# Patient Record
Sex: FEMALE | Race: WHITE | NOT HISPANIC OR LATINO | Employment: STUDENT | URBAN - METROPOLITAN AREA
[De-identification: names, ages, dates, MRNs, and addresses within clinical notes are randomized per-mention and may not be internally consistent; named-entity substitution may affect disease eponyms.]

---

## 2018-10-05 ENCOUNTER — OFFICE VISIT (OUTPATIENT)
Dept: FAMILY MEDICINE CLINIC | Facility: CLINIC | Age: 18
End: 2018-10-05

## 2018-10-05 VITALS
OXYGEN SATURATION: 98 % | SYSTOLIC BLOOD PRESSURE: 90 MMHG | HEART RATE: 80 BPM | RESPIRATION RATE: 17 BRPM | DIASTOLIC BLOOD PRESSURE: 60 MMHG | BODY MASS INDEX: 16.24 KG/M2 | WEIGHT: 86 LBS | HEIGHT: 61 IN | TEMPERATURE: 98.2 F

## 2018-10-05 DIAGNOSIS — Z00.00 ANNUAL PHYSICAL EXAM: Primary | ICD-10-CM

## 2018-10-05 LAB
25(OH)D3+25(OH)D2 SERPL-MCNC: 34.2 NG/ML (ref 30–100)
ALBUMIN SERPL-MCNC: 5.3 G/DL (ref 3.5–5.2)
ALBUMIN/GLOB SERPL: 2.2 G/DL
ALP SERPL-CCNC: 66 U/L (ref 39–117)
ALT SERPL-CCNC: 11 U/L (ref 1–33)
AST SERPL-CCNC: 15 U/L (ref 1–32)
BASOPHILS # BLD AUTO: 0.05 10*3/MM3 (ref 0–0.2)
BASOPHILS NFR BLD AUTO: 0.7 % (ref 0–1.5)
BILIRUB SERPL-MCNC: 0.3 MG/DL (ref 0.1–1.2)
BUN SERPL-MCNC: 17 MG/DL (ref 6–20)
BUN/CREAT SERPL: 21 (ref 7–25)
CALCIUM SERPL-MCNC: 10.1 MG/DL (ref 8.6–10.5)
CHLORIDE SERPL-SCNC: 105 MMOL/L (ref 98–107)
CO2 SERPL-SCNC: 24.9 MMOL/L (ref 22–29)
CREAT SERPL-MCNC: 0.81 MG/DL (ref 0.57–1)
EOSINOPHIL # BLD AUTO: 0.16 10*3/MM3 (ref 0–0.7)
EOSINOPHIL NFR BLD AUTO: 2.3 % (ref 0.3–6.2)
ERYTHROCYTE [DISTWIDTH] IN BLOOD BY AUTOMATED COUNT: 13 % (ref 11.7–13)
GLOBULIN SER CALC-MCNC: 2.4 GM/DL
GLUCOSE SERPL-MCNC: 92 MG/DL (ref 65–99)
HCT VFR BLD AUTO: 37.4 % (ref 35.6–45.5)
HGB BLD-MCNC: 12 G/DL (ref 11.9–15.5)
IMM GRANULOCYTES # BLD: 0.03 10*3/MM3 (ref 0–0.03)
IMM GRANULOCYTES NFR BLD: 0.4 % (ref 0–0.5)
LYMPHOCYTES # BLD AUTO: 1.6 10*3/MM3 (ref 0.9–4.8)
LYMPHOCYTES NFR BLD AUTO: 23 % (ref 19.6–45.3)
MCH RBC QN AUTO: 30.5 PG (ref 26.9–32)
MCHC RBC AUTO-ENTMCNC: 32.1 G/DL (ref 32.4–36.3)
MCV RBC AUTO: 95.2 FL (ref 80.5–98.2)
MONOCYTES # BLD AUTO: 0.51 10*3/MM3 (ref 0.2–1.2)
MONOCYTES NFR BLD AUTO: 7.3 % (ref 5–12)
NEUTROPHILS # BLD AUTO: 4.64 10*3/MM3 (ref 1.9–8.1)
NEUTROPHILS NFR BLD AUTO: 66.7 % (ref 42.7–76)
PLATELET # BLD AUTO: 209 10*3/MM3 (ref 140–500)
POTASSIUM SERPL-SCNC: 4.3 MMOL/L (ref 3.5–5.2)
PROT SERPL-MCNC: 7.7 G/DL (ref 6–8.5)
RBC # BLD AUTO: 3.93 10*6/MM3 (ref 3.9–5.2)
SODIUM SERPL-SCNC: 144 MMOL/L (ref 136–145)
WBC # BLD AUTO: 6.96 10*3/MM3 (ref 4.5–10.7)

## 2018-10-05 PROCEDURE — 99385 PREV VISIT NEW AGE 18-39: CPT | Performed by: NURSE PRACTITIONER

## 2018-10-05 NOTE — PROGRESS NOTES
Subjective   Jane Felix is a 17 y.o. female. Patient is here today for   Chief Complaint   Patient presents with   • Annual Exam     here  with guardian          Vitals:    10/05/18 1006   BP: 90/60   Pulse: 80   Resp: 17   Temp: 98.2 °F (36.8 °C)   SpO2: 98%      Patient's last menstrual period was 09/09/2018 (exact date).    The following portions of the patient's history were reviewed and updated as appropriate: allergies, current medications, past family history, past medical history, past social history, past surgical history and problem list.    No past medical history on file.   No Known Allergies   Social History     Social History   • Marital status: Single     Spouse name: N/A   • Number of children: N/A   • Years of education: N/A     Occupational History   • Not on file.     Social History Main Topics   • Smoking status: Never Smoker   • Smokeless tobacco: Not on file   • Alcohol use No   • Drug use: Unknown   • Sexual activity: Not on file     Other Topics Concern   • Not on file     Social History Narrative   • No narrative on file        Current Outpatient Prescriptions:   •  Multiple Vitamins-Minerals (CENTRUM ADULTS PO), Take  by mouth., Disp: , Rfl:        Objective   History of Present Illness   Jane Felix 17 y.o. female who presents for an Annual Wellness Visit.  she has a history of There is no problem list on file for this patient.  .  she has been doing well with new interval problems.  Labs results discussed in detail with the patient.  Plan to update vaccines if needed today.    Health Habits:  Dental Exam. up to date  Eye Exam. not up to date - mom states has no visual issues   Exercise: 3 times/week.  Current exercise activities include: rides scooter     She is not fasting today for labs  I reviewed records from Dr Sky office and they are scanned into epic    Lab Results (most recent)     None            Review of Systems   Constitutional: Negative.    HENT:        Ulcer to  tip of tongue, no pain    Respiratory: Negative.    Cardiovascular: Negative.    Gastrointestinal: Negative.    Genitourinary: Negative.        Physical Exam   Constitutional: She is oriented to person, place, and time. Vital signs are normal. She appears well-developed and well-nourished. No distress.   HENT:   Head: Normocephalic.   Right Ear: Tympanic membrane and ear canal normal.   Left Ear: Tympanic membrane and ear canal normal.   Nose: Nose normal.   Mouth/Throat: Uvula is midline and oropharynx is clear and moist.   Ulcer to tip of tongue   Wears braces    Eyes: Conjunctivae, EOM and lids are normal. Lids are everted and swept, no foreign bodies found.   Neck: Trachea normal.   Cardiovascular: Normal rate, regular rhythm and normal heart sounds.    Pulmonary/Chest: Effort normal and breath sounds normal. Right breast exhibits no mass, no skin change and no tenderness. Left breast exhibits no mass, no skin change and no tenderness.   Abdominal: Soft. Normal appearance. There is no tenderness.   Musculoskeletal: Normal range of motion.   Neurological: She is alert and oriented to person, place, and time.   Skin: Skin is warm and dry. No rash noted.       ASSESSMENT       Problem List Items Addressed This Visit     None      Visit Diagnoses     Annual physical exam    -  Primary    Relevant Orders    Comprehensive Metabolic Panel    CBC & Differential    Vitamin D 25 Hydroxy          PLAN    Will check routine labs, she is not fasting today  Declined flu vaccine  Follow up as needed

## 2018-10-08 ENCOUNTER — TELEPHONE (OUTPATIENT)
Dept: FAMILY MEDICINE CLINIC | Facility: CLINIC | Age: 18
End: 2018-10-08

## 2021-11-24 ENCOUNTER — OFFICE VISIT (OUTPATIENT)
Dept: INTERNAL MEDICINE | Facility: CLINIC | Age: 21
End: 2021-11-24

## 2021-11-24 VITALS
OXYGEN SATURATION: 100 % | HEIGHT: 61 IN | WEIGHT: 86 LBS | RESPIRATION RATE: 16 BRPM | TEMPERATURE: 97.8 F | SYSTOLIC BLOOD PRESSURE: 118 MMHG | DIASTOLIC BLOOD PRESSURE: 70 MMHG | BODY MASS INDEX: 16.24 KG/M2 | HEART RATE: 76 BPM

## 2021-11-24 DIAGNOSIS — B37.2 YEAST DERMATITIS: ICD-10-CM

## 2021-11-24 DIAGNOSIS — R21 RASH: Primary | ICD-10-CM

## 2021-11-24 PROCEDURE — 99202 OFFICE O/P NEW SF 15 MIN: CPT | Performed by: NURSE PRACTITIONER

## 2021-11-24 RX ORDER — CLOTRIMAZOLE 1 %
1 CREAM (GRAM) TOPICAL 2 TIMES DAILY
Qty: 45 G | Refills: 1 | Status: SHIPPED | OUTPATIENT
Start: 2021-11-24 | End: 2023-03-22

## 2021-11-24 NOTE — PROGRESS NOTES
Subjective   Jane Felix is a 21 y.o. female.   Chief Complaint   Patient presents with   • Rash     Vitals:    11/24/21 0828   BP: 118/70   Pulse: 76   Resp: 16   Temp: 97.8 °F (36.6 °C)   SpO2: 100%     No LMP recorded.    Jane is a 21 year old female patient who is established but has not been seen in our office since 10/5/2018. She c/o rash to right axilla . She is here with her mom today     Rash  This is a new problem. The current episode started 1 to 4 weeks ago. The problem is unchanged. Location: right axilla  The rash is characterized by redness and itchiness. She was exposed to nothing. Pertinent negatives include no fatigue or fever. (None ) Past treatments include topical steroids. The treatment provided mild relief.        The following portions of the patient's history were reviewed and updated as appropriate: allergies, current medications, past family history, past medical history, past social history, past surgical history and problem list.    Review of Systems   Constitutional: Negative for fatigue and fever.   Respiratory: Negative.    Cardiovascular: Negative.    Skin: Positive for rash.       Objective   Physical Exam  Vitals and nursing note reviewed.   Constitutional:       General: She is not in acute distress.     Appearance: Normal appearance. She is well-developed and well-groomed.   Cardiovascular:      Rate and Rhythm: Normal rate.   Pulmonary:      Effort: Pulmonary effort is normal.   Chest:   Breasts:      Right: No axillary adenopathy.       Lymphadenopathy:      Upper Body:      Right upper body: No axillary adenopathy.   Skin:     Findings: Rash is not crusting or vesicular.      Comments: Right axilla with erythematous rash with scaling, blanches, one satellite lesion noted    Neurological:      Mental Status: She is alert.         Assessment/Plan   Diagnoses and all orders for this visit:    1. Rash (Primary)    2. Yeast dermatitis    Other orders  -     clotrimazole  (LOTRIMIN) 1 % cream; Apply 1 application topically to the appropriate area as directed 2 (Two) Times a Day.  Dispense: 45 g; Refill: 1      Use clotrimazole for 2-4 weeks   Can use hydrocortisone as needed for itching  Keep the area clean and dry  Avoid using deodorant directly on rash and shaving on rash  Follow up if symptoms persist, worsen, or if new symptoms develop   Pt will schedule CPE with labs

## 2021-12-16 DIAGNOSIS — Z11.59 NEED FOR HEPATITIS C SCREENING TEST: ICD-10-CM

## 2021-12-16 DIAGNOSIS — Z00.00 ROUTINE HEALTH MAINTENANCE: Primary | ICD-10-CM

## 2021-12-21 LAB
ALBUMIN SERPL-MCNC: 4.9 G/DL (ref 3.9–5)
ALBUMIN/GLOB SERPL: 2.3 {RATIO} (ref 1.2–2.2)
ALP SERPL-CCNC: 66 IU/L (ref 44–121)
ALT SERPL-CCNC: 12 IU/L (ref 0–32)
AST SERPL-CCNC: 19 IU/L (ref 0–40)
BASOPHILS # BLD AUTO: 0.1 X10E3/UL (ref 0–0.2)
BASOPHILS NFR BLD AUTO: 2 %
BILIRUB SERPL-MCNC: 0.3 MG/DL (ref 0–1.2)
BUN SERPL-MCNC: 17 MG/DL (ref 6–20)
BUN/CREAT SERPL: 24 (ref 9–23)
CALCIUM SERPL-MCNC: 9.6 MG/DL (ref 8.7–10.2)
CHLORIDE SERPL-SCNC: 105 MMOL/L (ref 96–106)
CHOLEST SERPL-MCNC: 146 MG/DL (ref 100–199)
CO2 SERPL-SCNC: 22 MMOL/L (ref 20–29)
CREAT SERPL-MCNC: 0.72 MG/DL (ref 0.57–1)
EOSINOPHIL # BLD AUTO: 0.2 X10E3/UL (ref 0–0.4)
EOSINOPHIL NFR BLD AUTO: 2 %
ERYTHROCYTE [DISTWIDTH] IN BLOOD BY AUTOMATED COUNT: 11.1 % (ref 11.7–15.4)
GLOBULIN SER CALC-MCNC: 2.1 G/DL (ref 1.5–4.5)
GLUCOSE SERPL-MCNC: 89 MG/DL (ref 65–99)
HCT VFR BLD AUTO: 36 % (ref 34–46.6)
HCV AB S/CO SERPL IA: <0.1 S/CO RATIO (ref 0–0.9)
HCV AB SERPL QL IA: NORMAL
HDLC SERPL-MCNC: 66 MG/DL
HGB BLD-MCNC: 12.1 G/DL (ref 11.1–15.9)
IMM GRANULOCYTES # BLD AUTO: 0 X10E3/UL (ref 0–0.1)
IMM GRANULOCYTES NFR BLD AUTO: 1 %
LDLC SERPL CALC-MCNC: 70 MG/DL (ref 0–99)
LDLC/HDLC SERPL: 1.1 RATIO (ref 0–3.2)
LYMPHOCYTES # BLD AUTO: 2 X10E3/UL (ref 0.7–3.1)
LYMPHOCYTES NFR BLD AUTO: 31 %
MCH RBC QN AUTO: 30.6 PG (ref 26.6–33)
MCHC RBC AUTO-ENTMCNC: 33.6 G/DL (ref 31.5–35.7)
MCV RBC AUTO: 91 FL (ref 79–97)
MONOCYTES # BLD AUTO: 0.5 X10E3/UL (ref 0.1–0.9)
MONOCYTES NFR BLD AUTO: 8 %
NEUTROPHILS # BLD AUTO: 3.6 X10E3/UL (ref 1.4–7)
NEUTROPHILS NFR BLD AUTO: 56 %
PLATELET # BLD AUTO: 232 X10E3/UL (ref 150–450)
POTASSIUM SERPL-SCNC: 4.1 MMOL/L (ref 3.5–5.2)
PROT SERPL-MCNC: 7 G/DL (ref 6–8.5)
RBC # BLD AUTO: 3.95 X10E6/UL (ref 3.77–5.28)
SODIUM SERPL-SCNC: 142 MMOL/L (ref 134–144)
T4 FREE SERPL-MCNC: 1.67 NG/DL (ref 0.82–1.77)
TRIGL SERPL-MCNC: 41 MG/DL (ref 0–149)
TSH SERPL DL<=0.005 MIU/L-ACNC: 5.88 UIU/ML (ref 0.45–4.5)
UNABLE TO VOID: NORMAL
VLDLC SERPL CALC-MCNC: 10 MG/DL (ref 5–40)
WBC # BLD AUTO: 6.5 X10E3/UL (ref 3.4–10.8)

## 2021-12-27 ENCOUNTER — OFFICE VISIT (OUTPATIENT)
Dept: INTERNAL MEDICINE | Facility: CLINIC | Age: 21
End: 2021-12-27

## 2021-12-27 VITALS
HEIGHT: 61 IN | WEIGHT: 87.2 LBS | BODY MASS INDEX: 16.46 KG/M2 | OXYGEN SATURATION: 98 % | SYSTOLIC BLOOD PRESSURE: 100 MMHG | DIASTOLIC BLOOD PRESSURE: 80 MMHG | HEART RATE: 83 BPM

## 2021-12-27 DIAGNOSIS — Z00.00 ANNUAL PHYSICAL EXAM: Primary | ICD-10-CM

## 2021-12-27 PROCEDURE — 99395 PREV VISIT EST AGE 18-39: CPT | Performed by: NURSE PRACTITIONER

## 2021-12-27 NOTE — PROGRESS NOTES
Subjective   Jane Felix is a 21 y.o. female. Patient is here today for   Chief Complaint   Patient presents with   • Annual Exam          Vitals:    12/27/21 1528   BP: 100/80   Pulse: 83   SpO2: 98%     Body mass index is 16.48 kg/m².    The following portions of the patient's history were reviewed and updated as appropriate: allergies, current medications, past family history, past medical history, past social history, past surgical history and problem list.    Past Medical History:   Diagnosis Date   • Autism       No Known Allergies   Social History     Socioeconomic History   • Marital status: Single   Tobacco Use   • Smoking status: Never Smoker   Substance and Sexual Activity   • Alcohol use: No   • Sexual activity: Never        Current Outpatient Medications:   •  clotrimazole (LOTRIMIN) 1 % cream, Apply 1 application topically to the appropriate area as directed 2 (Two) Times a Day., Disp: 45 g, Rfl: 1  •  Multiple Vitamins-Minerals (CENTRUM ADULTS PO), Take  by mouth., Disp: , Rfl:        Objective   History of Present Illness   Jane Felix 21 y.o. female who presents for an Annual Wellness Visit.  she has a history of There is no problem list on file for this patient.  .  she has been doing well with new interval problems.  Labs results discussed in detail with the patient.  Plan to update vaccines if needed today. Her mom is with her today.     Health Habits:  Dental Exam. up to date  Eye Exam. up to date  Exercise: 3 times/week.  Current exercise activities include: scooter    Diet is well balanced       Lab Results (most recent)     Orders Only on 12/16/2021   Component Date Value Ref Range Status   • WBC 12/20/2021 6.5  3.4 - 10.8 x10E3/uL Final   • RBC 12/20/2021 3.95  3.77 - 5.28 x10E6/uL Final   • Hemoglobin 12/20/2021 12.1  11.1 - 15.9 g/dL Final   • Hematocrit 12/20/2021 36.0  34.0 - 46.6 % Final   • MCV 12/20/2021 91  79 - 97 fL Final   • MCH 12/20/2021 30.6  26.6 - 33.0 pg Final   • MCHC  12/20/2021 33.6  31.5 - 35.7 g/dL Final   • RDW 12/20/2021 11.1* 11.7 - 15.4 % Final   • Platelets 12/20/2021 232  150 - 450 x10E3/uL Final   • Neutrophil Rel % 12/20/2021 56  Not Estab. % Final   • Lymphocyte Rel % 12/20/2021 31  Not Estab. % Final   • Monocyte Rel % 12/20/2021 8  Not Estab. % Final   • Eosinophil Rel % 12/20/2021 2  Not Estab. % Final   • Basophil Rel % 12/20/2021 2  Not Estab. % Final   • Neutrophils Absolute 12/20/2021 3.6  1.4 - 7.0 x10E3/uL Final   • Lymphocytes Absolute 12/20/2021 2.0  0.7 - 3.1 x10E3/uL Final   • Monocytes Absolute 12/20/2021 0.5  0.1 - 0.9 x10E3/uL Final   • Eosinophils Absolute 12/20/2021 0.2  0.0 - 0.4 x10E3/uL Final   • Basophils Absolute 12/20/2021 0.1  0.0 - 0.2 x10E3/uL Final   • Immature Granulocyte Rel % 12/20/2021 1  Not Estab. % Final   • Immature Grans Absolute 12/20/2021 0.0  0.0 - 0.1 x10E3/uL Final   • Glucose 12/20/2021 89  65 - 99 mg/dL Final   • BUN 12/20/2021 17  6 - 20 mg/dL Final   • Creatinine 12/20/2021 0.72  0.57 - 1.00 mg/dL Final   • eGFR Non  Am 12/20/2021 118  >59 mL/min/1.73 Final   • eGFR African Am 12/20/2021 137  >59 mL/min/1.73 Final    Comment: **In accordance with recommendations from the NKF-ASN Task force,**    [facility] is in the process of updating its eGFR calculation to the    2021 CKD-EPI creatinine equation that estimates kidney function    without a race variable.     • BUN/Creatinine Ratio 12/20/2021 24* 9 - 23 Final   • Sodium 12/20/2021 142  134 - 144 mmol/L Final   • Potassium 12/20/2021 4.1  3.5 - 5.2 mmol/L Final   • Chloride 12/20/2021 105  96 - 106 mmol/L Final   • Total CO2 12/20/2021 22  20 - 29 mmol/L Final   • Calcium 12/20/2021 9.6  8.7 - 10.2 mg/dL Final   • Total Protein 12/20/2021 7.0  6.0 - 8.5 g/dL Final   • Albumin 12/20/2021 4.9  3.9 - 5.0 g/dL Final   • Globulin 12/20/2021 2.1  1.5 - 4.5 g/dL Final   • A/G Ratio 12/20/2021 2.3* 1.2 - 2.2 Final   • Total Bilirubin 12/20/2021 0.3  0.0 - 1.2 mg/dL Final   •  Alkaline Phosphatase 12/20/2021 66  44 - 121 IU/L Final                  **Please note reference interval change**   • AST (SGOT) 12/20/2021 19  0 - 40 IU/L Final   • ALT (SGPT) 12/20/2021 12  0 - 32 IU/L Final   • Hepatitis C Ab 12/20/2021 <0.1  0.0 - 0.9 s/co ratio Final   • Total Cholesterol 12/20/2021 146  100 - 199 mg/dL Final   • Triglycerides 12/20/2021 41  0 - 149 mg/dL Final   • HDL Cholesterol 12/20/2021 66  >39 mg/dL Final   • VLDL Cholesterol Neo 12/20/2021 10  5 - 40 mg/dL Final   • LDL Chol Calc (NIH) 12/20/2021 70  0 - 99 mg/dL Final   • LDL/HDL RATIO 12/20/2021 1.1  0.0 - 3.2 ratio Final    Comment:                                     LDL/HDL Ratio                                              Men  Women                                1/2 Avg.Risk  1.0    1.5                                    Avg.Risk  3.6    3.2                                 2X Avg.Risk  6.2    5.0                                 3X Avg.Risk  8.0    6.1     • TSH 12/20/2021 5.880* 0.450 - 4.500 uIU/mL Final   • Interpretation 12/20/2021 Comment   Final    Comment: Negative  Not infected with HCV, unless recent infection is suspected or other  evidence exists to indicate HCV infection.     • Free T4 12/20/2021 1.67  0.82 - 1.77 ng/dL Final   • Unable to Void 12/20/2021 Comment   Final    Comment: The patient was not able to render a urine sample and has been  instructed to return for a urine collection at their earliest  convenience.  The urine testing that you have requested has  been deleted from this report.  When the patient returns and  provides a urine specimen, the urine testing will be performed  and separately reported.                   Review of Systems   Constitutional: Negative for fatigue.   HENT: Negative.    Eyes: Negative for visual disturbance.   Respiratory: Negative.    Cardiovascular: Negative.    Gastrointestinal: Positive for nausea (and occasional vomits first day of period).   Genitourinary: Negative.     Musculoskeletal: Negative.    Skin: Negative.    Neurological: Negative.    Psychiatric/Behavioral: Negative.        Physical Exam  Vitals and nursing note reviewed.   Constitutional:       General: She is not in acute distress.     Appearance: Normal appearance. She is well-developed and well-groomed.   HENT:      Head: Normocephalic.   Cardiovascular:      Rate and Rhythm: Normal rate and regular rhythm.   Pulmonary:      Effort: Pulmonary effort is normal.      Breath sounds: Normal breath sounds.   Abdominal:      General: Bowel sounds are normal.      Palpations: Abdomen is soft.      Tenderness: There is no abdominal tenderness.   Skin:     General: Skin is warm and dry.   Neurological:      Mental Status: She is alert.         ASSESSMENT       Problems Addressed this Visit     None      Visit Diagnoses     Annual physical exam    -  Primary      Diagnoses       Codes Comments    Annual physical exam    -  Primary ICD-10-CM: Z00.00  ICD-9-CM: V70.0           PLAN    BP is normal   BMI is normal   Continue with healthy diet  Overall labs look good- tsh is slightly elevated at 5.8, pt is asymptomatic, denies any use of biotin or other supplements, will recheck in one year or sooner if needed   Declined flu vaccine at this time  Will schedule pap with gyn   Anticipatory guidance- continue to wear seatbelt, recommend helmet when riding scooter or a bike, continue to wear sunscreen,           Return in about 1 year (around 12/27/2022) for Annual physical, with labs.  Including tsh or sooner if needed

## 2023-03-08 DIAGNOSIS — Z13.220 LIPID SCREENING: ICD-10-CM

## 2023-03-08 DIAGNOSIS — Z13.29 THYROID DISORDER SCREEN: ICD-10-CM

## 2023-03-08 DIAGNOSIS — Z00.00 MEDICARE ANNUAL WELLNESS VISIT, SUBSEQUENT: Primary | ICD-10-CM

## 2023-03-15 LAB
ALBUMIN SERPL-MCNC: 5.1 G/DL (ref 3.5–5.2)
ALBUMIN/GLOB SERPL: 2.1 G/DL
ALP SERPL-CCNC: 71 U/L (ref 39–117)
ALT SERPL-CCNC: 25 U/L (ref 1–33)
AST SERPL-CCNC: 22 U/L (ref 1–32)
BASOPHILS # BLD AUTO: 0.1 10*3/MM3 (ref 0–0.2)
BASOPHILS NFR BLD AUTO: 1.3 % (ref 0–1.5)
BILIRUB SERPL-MCNC: 0.3 MG/DL (ref 0–1.2)
BUN SERPL-MCNC: 18 MG/DL (ref 6–20)
BUN/CREAT SERPL: 22.2 (ref 7–25)
CALCIUM SERPL-MCNC: 10.4 MG/DL (ref 8.6–10.5)
CHLORIDE SERPL-SCNC: 102 MMOL/L (ref 98–107)
CHOLEST SERPL-MCNC: 193 MG/DL (ref 0–200)
CO2 SERPL-SCNC: 22.3 MMOL/L (ref 22–29)
CREAT SERPL-MCNC: 0.81 MG/DL (ref 0.57–1)
EGFRCR SERPLBLD CKD-EPI 2021: 104.1 ML/MIN/1.73
EOSINOPHIL # BLD AUTO: 0.11 10*3/MM3 (ref 0–0.4)
EOSINOPHIL NFR BLD AUTO: 1.4 % (ref 0.3–6.2)
ERYTHROCYTE [DISTWIDTH] IN BLOOD BY AUTOMATED COUNT: 11.2 % (ref 12.3–15.4)
GLOBULIN SER CALC-MCNC: 2.4 GM/DL
GLUCOSE SERPL-MCNC: 92 MG/DL (ref 65–99)
HCT VFR BLD AUTO: 37.9 % (ref 34–46.6)
HDLC SERPL-MCNC: 78 MG/DL (ref 40–60)
HGB BLD-MCNC: 12.8 G/DL (ref 12–15.9)
IMM GRANULOCYTES # BLD AUTO: 0.05 10*3/MM3 (ref 0–0.05)
IMM GRANULOCYTES NFR BLD AUTO: 0.6 % (ref 0–0.5)
LDLC SERPL CALC-MCNC: 107 MG/DL (ref 0–100)
LDLC/HDLC SERPL: 1.38 {RATIO}
LYMPHOCYTES # BLD AUTO: 1.84 10*3/MM3 (ref 0.7–3.1)
LYMPHOCYTES NFR BLD AUTO: 23.3 % (ref 19.6–45.3)
MCH RBC QN AUTO: 30.8 PG (ref 26.6–33)
MCHC RBC AUTO-ENTMCNC: 33.8 G/DL (ref 31.5–35.7)
MCV RBC AUTO: 91.1 FL (ref 79–97)
MONOCYTES # BLD AUTO: 0.57 10*3/MM3 (ref 0.1–0.9)
MONOCYTES NFR BLD AUTO: 7.2 % (ref 5–12)
NEUTROPHILS # BLD AUTO: 5.23 10*3/MM3 (ref 1.7–7)
NEUTROPHILS NFR BLD AUTO: 66.2 % (ref 42.7–76)
NRBC BLD AUTO-RTO: 0 /100 WBC (ref 0–0.2)
PLATELET # BLD AUTO: 212 10*3/MM3 (ref 140–450)
POTASSIUM SERPL-SCNC: 4.6 MMOL/L (ref 3.5–5.2)
PROT SERPL-MCNC: 7.5 G/DL (ref 6–8.5)
RBC # BLD AUTO: 4.16 10*6/MM3 (ref 3.77–5.28)
SODIUM SERPL-SCNC: 138 MMOL/L (ref 136–145)
T4 FREE SERPL-MCNC: 1.52 NG/DL (ref 0.93–1.7)
TRIGL SERPL-MCNC: 38 MG/DL (ref 0–150)
TSH SERPL DL<=0.005 MIU/L-ACNC: 4.69 UIU/ML (ref 0.27–4.2)
UNABLE TO VOID: NORMAL
VLDLC SERPL CALC-MCNC: 8 MG/DL (ref 5–40)
WBC # BLD AUTO: 7.9 10*3/MM3 (ref 3.4–10.8)

## 2023-03-22 ENCOUNTER — OFFICE VISIT (OUTPATIENT)
Dept: INTERNAL MEDICINE | Facility: CLINIC | Age: 23
End: 2023-03-22
Payer: MEDICARE

## 2023-03-22 VITALS
HEIGHT: 61 IN | HEART RATE: 84 BPM | TEMPERATURE: 97.7 F | BODY MASS INDEX: 16.24 KG/M2 | RESPIRATION RATE: 16 BRPM | OXYGEN SATURATION: 99 % | SYSTOLIC BLOOD PRESSURE: 102 MMHG | WEIGHT: 86 LBS | DIASTOLIC BLOOD PRESSURE: 60 MMHG

## 2023-03-22 DIAGNOSIS — Z00.00 ANNUAL PHYSICAL EXAM: Primary | ICD-10-CM

## 2023-03-22 DIAGNOSIS — R11.0 NAUSEA: ICD-10-CM

## 2023-03-22 RX ORDER — ONDANSETRON 4 MG/1
4 TABLET, FILM COATED ORAL EVERY 8 HOURS PRN
Qty: 30 TABLET | Refills: 3 | Status: SHIPPED | OUTPATIENT
Start: 2023-03-22

## 2024-07-31 DIAGNOSIS — Z00.00 ROUTINE HEALTH MAINTENANCE: Primary | ICD-10-CM

## 2024-08-29 LAB
ALBUMIN SERPL-MCNC: 5 G/DL (ref 3.5–5.2)
ALBUMIN/GLOB SERPL: 2 G/DL
ALP SERPL-CCNC: 81 U/L (ref 39–117)
ALT SERPL-CCNC: 10 U/L (ref 1–33)
AST SERPL-CCNC: 16 U/L (ref 1–32)
BASOPHILS # BLD AUTO: 0.07 10*3/MM3 (ref 0–0.2)
BASOPHILS NFR BLD AUTO: 1 % (ref 0–1.5)
BILIRUB SERPL-MCNC: 0.4 MG/DL (ref 0–1.2)
BUN SERPL-MCNC: 20 MG/DL (ref 6–20)
BUN/CREAT SERPL: 28.2 (ref 7–25)
CALCIUM SERPL-MCNC: 10 MG/DL (ref 8.6–10.5)
CHLORIDE SERPL-SCNC: 100 MMOL/L (ref 98–107)
CHOLEST SERPL-MCNC: 166 MG/DL (ref 0–200)
CO2 SERPL-SCNC: 25.1 MMOL/L (ref 22–29)
CREAT SERPL-MCNC: 0.71 MG/DL (ref 0.57–1)
EGFRCR SERPLBLD CKD-EPI 2021: 121.2 ML/MIN/1.73
EOSINOPHIL # BLD AUTO: 0.13 10*3/MM3 (ref 0–0.4)
EOSINOPHIL NFR BLD AUTO: 1.9 % (ref 0.3–6.2)
ERYTHROCYTE [DISTWIDTH] IN BLOOD BY AUTOMATED COUNT: 11 % (ref 12.3–15.4)
GLOBULIN SER CALC-MCNC: 2.5 GM/DL
GLUCOSE SERPL-MCNC: 81 MG/DL (ref 65–99)
HCT VFR BLD AUTO: 37.3 % (ref 34–46.6)
HDLC SERPL-MCNC: 80 MG/DL (ref 40–60)
HGB BLD-MCNC: 12.2 G/DL (ref 12–15.9)
IMM GRANULOCYTES # BLD AUTO: 0.03 10*3/MM3 (ref 0–0.05)
IMM GRANULOCYTES NFR BLD AUTO: 0.4 % (ref 0–0.5)
LDLC SERPL CALC-MCNC: 75 MG/DL (ref 0–100)
LDLC/HDLC SERPL: 0.95 {RATIO}
LYMPHOCYTES # BLD AUTO: 2.03 10*3/MM3 (ref 0.7–3.1)
LYMPHOCYTES NFR BLD AUTO: 29 % (ref 19.6–45.3)
MCH RBC QN AUTO: 30.4 PG (ref 26.6–33)
MCHC RBC AUTO-ENTMCNC: 32.7 G/DL (ref 31.5–35.7)
MCV RBC AUTO: 93 FL (ref 79–97)
MONOCYTES # BLD AUTO: 0.54 10*3/MM3 (ref 0.1–0.9)
MONOCYTES NFR BLD AUTO: 7.7 % (ref 5–12)
NEUTROPHILS # BLD AUTO: 4.21 10*3/MM3 (ref 1.7–7)
NEUTROPHILS NFR BLD AUTO: 60 % (ref 42.7–76)
NRBC BLD AUTO-RTO: 0 /100 WBC (ref 0–0.2)
PLATELET # BLD AUTO: 220 10*3/MM3 (ref 140–450)
POTASSIUM SERPL-SCNC: 4 MMOL/L (ref 3.5–5.2)
PROT SERPL-MCNC: 7.5 G/DL (ref 6–8.5)
RBC # BLD AUTO: 4.01 10*6/MM3 (ref 3.77–5.28)
SODIUM SERPL-SCNC: 137 MMOL/L (ref 136–145)
T4 FREE SERPL-MCNC: 1.47 NG/DL (ref 0.93–1.7)
TRIGL SERPL-MCNC: 51 MG/DL (ref 0–150)
TSH SERPL DL<=0.005 MIU/L-ACNC: 5.81 UIU/ML (ref 0.27–4.2)
VLDLC SERPL CALC-MCNC: 11 MG/DL (ref 5–40)
WBC # BLD AUTO: 7.01 10*3/MM3 (ref 3.4–10.8)

## 2024-09-05 ENCOUNTER — OFFICE VISIT (OUTPATIENT)
Dept: INTERNAL MEDICINE | Facility: CLINIC | Age: 24
End: 2024-09-05
Payer: MEDICARE

## 2024-09-05 VITALS
OXYGEN SATURATION: 97 % | HEART RATE: 67 BPM | SYSTOLIC BLOOD PRESSURE: 106 MMHG | HEIGHT: 61 IN | DIASTOLIC BLOOD PRESSURE: 60 MMHG | BODY MASS INDEX: 17.37 KG/M2 | RESPIRATION RATE: 16 BRPM | WEIGHT: 92 LBS

## 2024-09-05 DIAGNOSIS — Z00.00 MEDICARE ANNUAL WELLNESS VISIT, SUBSEQUENT: ICD-10-CM

## 2024-09-05 DIAGNOSIS — Z00.00 ANNUAL PHYSICAL EXAM: Primary | ICD-10-CM

## 2024-09-05 DIAGNOSIS — R79.89 ELEVATED TSH: ICD-10-CM

## 2024-09-05 NOTE — PROGRESS NOTES
" Subjective   The ABCs of the Annual Wellness Visit  Medicare Wellness Visit      Jane Felix is a 23 y.o. patient who presents for a Medicare Wellness Visit.    The following portions of the patient's history were reviewed and   updated as appropriate: allergies, current medications, past family history, past medical history, past social history, past surgical history, and problem list.    Compared to one year ago, the patient's physical   health is the same.  Compared to one year ago, the patient's mental   health is the same.    Recent Hospitalizations:  She was not admitted to the hospital during the last year.     Current Medical Providers:  Patient Care Team:  Luiza Weaver APRN as PCP - General (Family Medicine)    Outpatient Medications Prior to Visit   Medication Sig Dispense Refill    Multiple Vitamins-Minerals (CENTRUM ADULTS PO) Take  by mouth.      ondansetron (Zofran) 4 MG tablet Take 1 tablet by mouth Every 8 (Eight) Hours As Needed for Nausea or Vomiting. 30 tablet 3    vitamin C (ASCORBIC ACID) 250 MG tablet Take 1 tablet by mouth Daily.       No facility-administered medications prior to visit.     No opioid medication identified on active medication list. I have reviewed chart for other potential  high risk medication/s and harmful drug interactions in the elderly.      Aspirin is not on active medication list.  Aspirin use is not indicated based on review of current medical condition/s. Risk of harm outweighs potential benefits.  .    There is no problem list on file for this patient.    Advance Care Planning Advance Directive is not on file.  ACP discussion was held with the patient during this visit. Mom has legal guardianship             Objective   Vitals:    09/05/24 1346   BP: 106/60   Pulse: 67   Resp: 16   SpO2: 97%   Weight: 41.7 kg (92 lb)   Height: 154.9 cm (61\")   PainSc: 0-No pain       Estimated body mass index is 17.38 kg/m² as calculated from the following:    Height as of " "this encounter: 154.9 cm (61\").    Weight as of this encounter: 41.7 kg (92 lb).    BMI is below normal parameters (malnutrition). Recommendations: Information on healthy weight added to patient's after visit summary       Does the patient have evidence of cognitive impairment? No  Lab Results   Component Value Date    CHLPL 166 2024    TRIG 51 2024    HDL 80 (H) 2024    LDL 75 2024    VLDL 11 2024                                                                                                Health  Risk Assessment    Smoking Status:  Social History     Tobacco Use   Smoking Status Never   Smokeless Tobacco Never     Alcohol Consumption:  Social History     Substance and Sexual Activity   Alcohol Use No       Fall Risk Screen  STEADI Fall Risk Assessment was completed, and patient is at LOW risk for falls.Assessment completed on:2024    Depression Screenin/5/2024     1:50 PM   PHQ-2/PHQ-9 Depression Screening   Little Interest or Pleasure in Doing Things 0-->not at all   Feeling Down, Depressed or Hopeless 0-->not at all   PHQ-9: Brief Depression Severity Measure Score 0     Health Habits and Functional and Cognitive Screenin/5/2024     1:48 PM   Functional & Cognitive Status   Do you have difficulty preparing food and eating? No   Do you have difficulty bathing yourself, getting dressed or grooming yourself? No   Do you have difficulty using the toilet? No   Do you have difficulty moving around from place to place? No   Do you have trouble with steps or getting out of a bed or a chair? No   Current Diet Well Balanced Diet   Dental Exam Up to date   Eye Exam Not up to date   Exercise (times per week) 7 times per week   Current Exercises Include Walking   Do you need help using the phone?  No   Are you deaf or do you have serious difficulty hearing?  No   Do you need help to go to places out of walking distance? Yes   Do you need help shopping? Yes   Do you need " help preparing meals?  No   Do you need help with housework?  No   Do you need help with laundry? No   Do you need help taking your medications? No   Do you need help managing money? Yes   Do you ever drive or ride in a car without wearing a seat belt? No   Have you felt unusual stress, anger or loneliness in the last month? No   Who do you live with? Other   If you need help, do you have trouble finding someone available to you? No   Have you been bothered in the last four weeks by sexual problems? No   Do you have difficulty concentrating, remembering or making decisions? No           Age-appropriate Screening Schedule:  Refer to the list below for future screening recommendations based on patient's age, sex and/or medical conditions. Orders for these recommended tests are listed in the plan section. The patient has been provided with a written plan.    Health Maintenance List  Health Maintenance   Topic Date Due    INFLUENZA VACCINE  10/01/2024 (Originally 8/1/2024)    PAP SMEAR  10/28/2024 (Originally 10/5/2018)    COVID-19 Vaccine (4 - 2023-24 season) 11/25/2024 (Originally 9/1/2024)    ANNUAL WELLNESS VISIT  09/05/2025    BMI FOLLOWUP  09/05/2025    TDAP/TD VACCINES (3 - Td or Tdap) 12/11/2031    HEPATITIS C SCREENING  Completed    Pneumococcal Vaccine 0-64  Aged Out    HPV VACCINES  Discontinued                                                                                                                                                CMS Preventative Services Quick Reference  Risk Factors Identified During Encounter  Immunizations Discussed/Encouraged: Influenza and COVID19    The above risks/problems have been discussed with the patient.  Pertinent information has been shared with the patient in the After Visit Summary.  An After Visit Summary and PPPS were made available to the patient.    Follow Up:   Next Medicare Wellness visit to be scheduled in 1 year.         Additional E&M Note during same  "encounter follows:  Patient has additional, significant, and separately identifiable condition(s)/problem(s) that require work above and beyond the Medicare Wellness Visit     Chief Complaint  Annual Exam    Subjective   HPI  Jane is also being seen today for an annual adult preventative physical exam. Her mom is here with her today     Review of Systems   Constitutional:  Negative for fatigue.   HENT: Negative.     Respiratory: Negative.     Cardiovascular: Negative.    Gastrointestinal: Negative.    Genitourinary: Negative.    Musculoskeletal: Negative.    Neurological: Negative.    Hematological: Negative.    Psychiatric/Behavioral: Negative.                Objective   Vital Signs:  /60   Pulse 67   Resp 16   Ht 154.9 cm (61\")   Wt 41.7 kg (92 lb)   SpO2 97%   BMI 17.38 kg/m²   Physical Exam  Vitals and nursing note reviewed.   Constitutional:       General: She is not in acute distress.     Appearance: Normal appearance. She is well-developed and well-groomed.   HENT:      Head: Normocephalic.      Right Ear: Tympanic membrane and ear canal normal.      Left Ear: Tympanic membrane and ear canal normal.      Nose: Nose normal.      Mouth/Throat:      Pharynx: Oropharynx is clear.   Eyes:      General: Lids are normal.      Conjunctiva/sclera: Conjunctivae normal.   Neck:      Thyroid: No thyromegaly.   Cardiovascular:      Rate and Rhythm: Normal rate and regular rhythm.      Heart sounds: Normal heart sounds.   Pulmonary:      Effort: Pulmonary effort is normal.      Breath sounds: Normal breath sounds.   Abdominal:      General: Bowel sounds are normal.   Musculoskeletal:      Cervical back: Neck supple.   Skin:     General: Skin is warm and dry.   Neurological:      Mental Status: She is alert.   Psychiatric:         Mood and Affect: Mood normal.         The following data was reviewed by: JAEL Forrest on 09/05/2024:  No visits with results within 1 Month(s) from this visit.   Latest " known visit with results is:   Orders Only on 07/31/2024   Component Date Value Ref Range Status    Glucose 08/29/2024 81  65 - 99 mg/dL Final    BUN 08/29/2024 20  6 - 20 mg/dL Final    Creatinine 08/29/2024 0.71  0.57 - 1.00 mg/dL Final    EGFR Result 08/29/2024 121.2  >60.0 mL/min/1.73 Final    Comment: GFR Normal >60  Chronic Kidney Disease <60  Kidney Failure <15      BUN/Creatinine Ratio 08/29/2024 28.2 (H)  7.0 - 25.0 Final    Sodium 08/29/2024 137  136 - 145 mmol/L Final    Potassium 08/29/2024 4.0  3.5 - 5.2 mmol/L Final    Chloride 08/29/2024 100  98 - 107 mmol/L Final    Total CO2 08/29/2024 25.1  22.0 - 29.0 mmol/L Final    Calcium 08/29/2024 10.0  8.6 - 10.5 mg/dL Final    Total Protein 08/29/2024 7.5  6.0 - 8.5 g/dL Final    Albumin 08/29/2024 5.0  3.5 - 5.2 g/dL Final    Globulin 08/29/2024 2.5  gm/dL Final    A/G Ratio 08/29/2024 2.0  g/dL Final    Total Bilirubin 08/29/2024 0.4  0.0 - 1.2 mg/dL Final    Alkaline Phosphatase 08/29/2024 81  39 - 117 U/L Final    AST (SGOT) 08/29/2024 16  1 - 32 U/L Final    ALT (SGPT) 08/29/2024 10  1 - 33 U/L Final    Total Cholesterol 08/29/2024 166  0 - 200 mg/dL Final    Comment: Cholesterol Reference Ranges  (U.S. Department of Health and Human Services ATP III  Classifications)  Desirable          <200 mg/dL  Borderline High    200-239 mg/dL  High Risk          >240 mg/dL  Triglyceride Reference Ranges  (U.S. Department of Health and Human Services ATP III  Classifications)  Normal           <150 mg/dL  Borderline High  150-199 mg/dL  High             200-499 mg/dL  Very High        >500 mg/dL  HDL Reference Ranges  (U.S. Department of Health and Human Services ATP III  Classifications)  Low     <40 mg/dl (major risk factor for CHD)  High    >60 mg/dl ('negative' risk factor for CHD)  LDL Reference Ranges  (U.S. Department of Health and Human Services ATP III  Classifications)  Optimal          <100 mg/dL  Near Optimal     100-129 mg/dL  Borderline High  130-159  mg/dL  High             160-189 mg/dL  Very High        >189 mg/dL      Triglycerides 08/29/2024 51  0 - 150 mg/dL Final    HDL Cholesterol 08/29/2024 80 (H)  40 - 60 mg/dL Final    VLDL Cholesterol Neo 08/29/2024 11  5 - 40 mg/dL Final    LDL Chol Calc (NIH) 08/29/2024 75  0 - 100 mg/dL Final    LDL/HDL RATIO 08/29/2024 0.95   Final    TSH 08/29/2024 5.810 (H)  0.270 - 4.200 uIU/mL Final    WBC 08/29/2024 7.01  3.40 - 10.80 10*3/mm3 Final    RBC 08/29/2024 4.01  3.77 - 5.28 10*6/mm3 Final    Hemoglobin 08/29/2024 12.2  12.0 - 15.9 g/dL Final    Hematocrit 08/29/2024 37.3  34.0 - 46.6 % Final    MCV 08/29/2024 93.0  79.0 - 97.0 fL Final    MCH 08/29/2024 30.4  26.6 - 33.0 pg Final    MCHC 08/29/2024 32.7  31.5 - 35.7 g/dL Final    RDW 08/29/2024 11.0 (L)  12.3 - 15.4 % Final    Platelets 08/29/2024 220  140 - 450 10*3/mm3 Final    Neutrophil Rel % 08/29/2024 60.0  42.7 - 76.0 % Final    Lymphocyte Rel % 08/29/2024 29.0  19.6 - 45.3 % Final    Monocyte Rel % 08/29/2024 7.7  5.0 - 12.0 % Final    Eosinophil Rel % 08/29/2024 1.9  0.3 - 6.2 % Final    Basophil Rel % 08/29/2024 1.0  0.0 - 1.5 % Final    Neutrophils Absolute 08/29/2024 4.21  1.70 - 7.00 10*3/mm3 Final    Lymphocytes Absolute 08/29/2024 2.03  0.70 - 3.10 10*3/mm3 Final    Monocytes Absolute 08/29/2024 0.54  0.10 - 0.90 10*3/mm3 Final    Eosinophils Absolute 08/29/2024 0.13  0.00 - 0.40 10*3/mm3 Final    Basophils Absolute 08/29/2024 0.07  0.00 - 0.20 10*3/mm3 Final    Immature Granulocyte Rel % 08/29/2024 0.4  0.0 - 0.5 % Final    Immature Grans Absolute 08/29/2024 0.03  0.00 - 0.05 10*3/mm3 Final    nRBC 08/29/2024 0.0  0.0 - 0.2 /100 WBC Final    Free T4 08/29/2024 1.47  0.93 - 1.70 ng/dL Final    Results may be falsely increased if patient taking Biotin.             Assessment and Plan Additional age appropriate preventative wellness advice topics were discussed during today's preventative wellness exam(some topics already addressed during AWV portion  of the note above):     Age and sex appropriate physical exam performed and documented. Updated past medical, family, social and surgical histories as well as allergies and care team list. Addressed care gaps listed in the medical record.   - seat belt use for every car ride for patient and all occupants.   Encouraged sunscreen use to reduce risk of skin cancer for any days with sun exposure over 20 minutes.   -Encouraged annual dental and vision exams as part of their overall health.   -Encouraged  exercise  combined with a well-balanced diet.   -Immunizations reviewed and updated in EMR.               Annual physical exam    Medicare annual wellness visit, subsequent    Elevated TSH       Tsh is slightly elevated , pt is asymptomatic so will continue to monitor   She will schedule with gyn for vaginal exam , pap.        Follow Up   Return in about 1 year (around 9/5/2025) for medicare wellness, fasting labs. Tsh , free t4 and thyroid antibodies   Patient was given instructions and counseling regarding her condition or for health maintenance advice. Please see specific information pulled into the AVS if appropriate.

## 2024-10-24 NOTE — PROGRESS NOTES
Subjective   Jane Felix is a 22 y.o. female. Patient is here today for   Chief Complaint   Patient presents with   • Annual Exam          Vitals:    03/22/23 1522   BP: 102/60   Pulse: 84   Resp: 16   Temp: 97.7 °F (36.5 °C)   SpO2: 99%     Body mass index is 16.26 kg/m².    The following portions of the patient's history were reviewed and updated as appropriate: allergies, current medications, past family history, past medical history, past social history, past surgical history and problem list.    Past Medical History:   Diagnosis Date   • Autism       No Known Allergies   Social History     Socioeconomic History   • Marital status: Single   Tobacco Use   • Smoking status: Never   • Smokeless tobacco: Never   Vaping Use   • Vaping Use: Never used   Substance and Sexual Activity   • Alcohol use: No   • Drug use: Never   • Sexual activity: Never        Current Outpatient Medications:   •  Multiple Vitamins-Minerals (CENTRUM ADULTS PO), Take  by mouth., Disp: , Rfl:   •  ondansetron (Zofran) 4 MG tablet, Take 1 tablet by mouth Every 8 (Eight) Hours As Needed for Nausea or Vomiting., Disp: 30 tablet, Rfl: 3       Objective   History of Present Illness   Jane Felix 22 y.o. female who presents for an Annual physical exam.   Lab results discussed in detail with the patient.  Plan to update vaccines if needed today.    Health Habits:  Dental Exam. up to date  Eye Exam. not up to date - .does not need   Exercise: 3 times/week.  Current exercise activities include: walking    Preventative counseling  Discussed face to face the importance of healthy diet and exercise.     Lab Results (most recent)     None            Review of Systems   Constitutional: Negative for fatigue.   Respiratory: Negative.    Cardiovascular: Negative.    Gastrointestinal: Positive for nausea (with her periods ).   Genitourinary: Positive for menstrual problem (heavy periods ).   Musculoskeletal: Negative.    Neurological: Negative.     Hematological: Negative.    Psychiatric/Behavioral: Negative.        Physical Exam  Vitals and nursing note reviewed.   Constitutional:       General: She is not in acute distress.     Appearance: Normal appearance. She is well-developed and well-groomed.   HENT:      Head: Normocephalic.      Right Ear: Tympanic membrane and ear canal normal.      Left Ear: Tympanic membrane and ear canal normal.      Nose: Nose normal.      Mouth/Throat:      Pharynx: Oropharynx is clear.   Eyes:      General: Lids are normal.      Conjunctiva/sclera: Conjunctivae normal.   Neck:      Thyroid: No thyromegaly.   Cardiovascular:      Rate and Rhythm: Normal rate and regular rhythm.   Pulmonary:      Effort: Pulmonary effort is normal.      Breath sounds: Normal breath sounds.   Abdominal:      General: Bowel sounds are normal.   Musculoskeletal:      Cervical back: Neck supple.   Skin:     General: Skin is warm and dry.   Neurological:      Mental Status: She is alert and oriented to person, place, and time.   Psychiatric:         Mood and Affect: Mood normal.         ASSESSMENT       Problems Addressed this Visit    None  Visit Diagnoses     Annual physical exam    -  Primary    Nausea          Diagnoses       Codes Comments    Annual physical exam    -  Primary ICD-10-CM: Z00.00  ICD-9-CM: V70.0     Nausea     ICD-10-CM: R11.0  ICD-9-CM: 787.02           PLAN  Pt will schedule appt with gyn for pap, discussed OCPs for heavy periods. rx for zofran to take as needed for nausea during her periods   Age and sex appropriate physical exam performed and documented. Updated past medical, family, social and surgical histories as well as allergies and care team list. Addressed care gaps listed in the medical record.   -Encouraged seat belt use for every car ride for patient and all occupants. Encouraged sunscreen use to reduce risk of skin cancer for any days with sun exposure over 20 minutes.   -Encouraged annual dental exams as part of  their overall health.   -Encouraged minimum of 30 minutes or more of exercise at a brisk walk or higher 5 days per week combined with a well-balanced diet.   -Immunizations reviewed and updated in EMR.     Return in about 1 year (around 3/22/2024) for Annual physical.  Patient was given instructions and counseling regarding her  condition for health maintenance advice.  Please see specific information pulled into the AVS if appropriate.    Positive

## 2024-12-12 RX ORDER — ONDANSETRON 4 MG/1
4 TABLET, FILM COATED ORAL EVERY 8 HOURS PRN
Qty: 30 TABLET | Refills: 3 | Status: SHIPPED | OUTPATIENT
Start: 2024-12-12

## 2024-12-12 NOTE — TELEPHONE ENCOUNTER
Caller: Barbara Felix    Relationship: Mother    Best call back number:[phone]     Requested Prescriptions:   Requested Prescriptions     Pending Prescriptions Disp Refills    ondansetron (Zofran) 4 MG tablet 30 tablet 3     Sig: Take 1 tablet by mouth Every 8 (Eight) Hours As Needed for Nausea or Vomiting.        Pharmacy where request should be sent: [facility] [id number] [address] - [phone] [facility] [phone] FX     Last office visit with prescribing clinician: 9/5/2024   Last telemedicine visit with prescribing clinician: Visit date not found   Next office visit with prescribing clinician: 9/12/2025         Does the patient have less than a 3 day supply:  [x] Yes  [] No    Would you like a call back once the refill request has been completed: [] Yes [] No    If the office needs to give you a call back, can they leave a voicemail: [] Yes [] No    Wen Brannon Rep   12/12/24 10:31 EST

## 2025-02-07 ENCOUNTER — OFFICE VISIT (OUTPATIENT)
Dept: OBSTETRICS AND GYNECOLOGY | Age: 25
End: 2025-02-07
Payer: MEDICARE

## 2025-02-07 VITALS
BODY MASS INDEX: 17.94 KG/M2 | DIASTOLIC BLOOD PRESSURE: 64 MMHG | WEIGHT: 95 LBS | HEIGHT: 61 IN | SYSTOLIC BLOOD PRESSURE: 106 MMHG

## 2025-02-07 DIAGNOSIS — Z01.419 WELL FEMALE EXAM WITH ROUTINE GYNECOLOGICAL EXAM: ICD-10-CM

## 2025-02-07 DIAGNOSIS — Z01.419 ENCOUNTER FOR GYNECOLOGICAL EXAMINATION WITHOUT ABNORMAL FINDING: Primary | ICD-10-CM

## 2025-02-07 DIAGNOSIS — Z11.51 SCREENING FOR HUMAN PAPILLOMAVIRUS (HPV): ICD-10-CM

## 2025-02-07 DIAGNOSIS — Z12.4 SCREENING FOR MALIGNANT NEOPLASM OF CERVIX: ICD-10-CM

## 2025-02-07 NOTE — PROGRESS NOTES
Routine Annual Visit    2025    Patient: Jane Felix          MR#:[id number]      Chief Complaint   Patient presents with    Gynecologic Exam     New Gyn - Establishing Care - no pap hx, pt c/o irregular periods, pt has noticed nausea on the first day of her menses & has taken antinausea meds for this but doesn't always help         History of Present Illness    24 y.o. female  who presents for annual exam.     Patient is here with her mother  The patient has high functioning autism  She is able to give her own history but her mother does help  The patient is having some irregularities in her cycle  Sometimes they are heavy  They are irregular within 7 to 10 days  She does have some nausea the first day  She is worried about having problems with this why she is working  She is looking for a job  They are interested in a birth control pill to control her cycles  She did have the HPV vaccine  She is not sexually active  I discussed risk benefits and alternatives of doing a Pap today  She does not use tampons  I think the Pap would be difficult to obtain  The patient would like to decline the Pap this year and we might try to do it next year  I think this is reasonable given her low risk status        Patient's last menstrual period was 2025 (exact date).  Obstetric History:  OB History          0    Para   0    Term   0       0    AB   0    Living   0         SAB   0    IAB   0    Ectopic   0    Molar   0    Multiple   0    Live Births   0               Menstrual History:     Patient's last menstrual period was 2025 (exact date).       Sexual History:       ________________________________________  Patient Active Problem List   Diagnosis    Elevated TSH       Past Medical History:   Diagnosis Date    Autism        Past Surgical History:   Procedure Laterality Date    WISDOM TOOTH EXTRACTION         Social History     Tobacco Use   Smoking Status Never    Passive exposure:  "Never   Smokeless Tobacco Never       has a current medication list which includes the following prescription(s): ondansetron, vitamin c, and norethindrone-ethinyl estradiol-ferrous fumarate.  ________________________________________    Current contraception: abstinence  History of abnormal Pap smear: no  Family history of Breast cancer: no        The following portions of the patient's history were reviewed and updated as appropriate: allergies, current medications, past family history, past medical history, past social history, past surgical history, and problem list.    Review of Systems    Pertinent items are noted in HPI.     Objective   Physical Exam    /64 (BP Location: Left arm, Patient Position: Sitting)   Ht 154.9 cm (61\")   Wt 43.1 kg (95 lb)   LMP 01/17/2025 (Exact Date)   BMI 17.95 kg/m²    BP Readings from Last 3 Encounters:   02/07/25 106/64   09/05/24 106/60   12/13/23 105/71      Wt Readings from Last 3 Encounters:   02/07/25 43.1 kg (95 lb)   09/05/24 41.7 kg (92 lb)   12/13/23 43.1 kg (95 lb)      BMI: Estimated body mass index is 17.95 kg/m² as calculated from the following:    Height as of this encounter: 154.9 cm (61\").    Weight as of this encounter: 43.1 kg (95 lb).      General:   alert, appears stated age, and cooperative   Abdomen: soft, non-tender, without masses or organomegaly                             Assessment:    1. Normal annual exam   Assessment     ICD-10-CM ICD-9-CM   1. Encounter for gynecological examination without abnormal finding  Z01.419 V72.31   2. Well female exam with routine gynecological exam  Z01.419 V72.31   3. Screening for human papillomavirus (HPV)  Z11.51 V73.81   4. Screening for malignant neoplasm of cervix  Z12.4 V76.2     Plan:    Plan       []  PAP done  []  Labs:   []  GC/Chl/TV          Diagnoses and all orders for this visit:    1. Encounter for gynecological examination without abnormal finding (Primary)    2. Well female exam with routine " gynecological exam  -     Cancel: IGP, Apt HPV,rfx 16 / 18,45    3. Screening for human papillomavirus (HPV)  -     Cancel: IGP, Apt HPV,rfx 16 / 18,45    4. Screening for malignant neoplasm of cervix  -     Cancel: IGP, Apt HPV,rfx 16 / 18,45    Other orders  -     norethindrone-ethinyl estradiol-ferrous fumarate (LOESTIN 24 FE) 1-20 MG-MCG(24) per tablet; Take 1 tablet by mouth Daily.  Dispense: 84 tablet; Refill: 3      Declines Pap and pelvic exam today  Clinical history is reassuring  We will consider Pap and pelvic next year      Counseling:  --Nutrition: Stressed importance of moderation and caloric balance, stressed fresh fruit and vegetables  --Exercise: Stressed the importance of regular exercise. 3-5 times weekly       --Discussed pap smear screening recommendations

## 2025-08-26 DIAGNOSIS — Z00.00 ROUTINE HEALTH MAINTENANCE: Primary | ICD-10-CM
